# Patient Record
(demographics unavailable — no encounter records)

---

## 2025-07-24 NOTE — PLAN
[FreeTextEntry1] : Patient here for an annual well woman encounter. Patient with atrophic gynecological exam. Sister with hx premenopausal br ca   Hx osteopenia Weightbearing exercise encouraged. Referral provided today for mammogram and sonogram. Referral provided today for DEXA. Patient to follow-up for annual gynecological follow-up or as needed. PHQ-9 questionnaire reviewed. Patient scored 0. No intervention needed.  I, Sabrina Parikh, acted solely as a scribe for Dr. Piter Pro on this date 07/24/2025.   All medical record entries made by the Scribe were at my, Dr. Piter Pro, direction on 07/24/2025. I have reviewed the chart and agree that the record accurately reflects my personal performance of the history, physical exam, assessment and plan.

## 2025-07-24 NOTE — HISTORY OF PRESENT ILLNESS
[FreeTextEntry1] : Patient is a 72-year-old here for an annual well woman encounter without complaints. Denies any vaginal discharge, itching and burning. Denies any urinary problems.  Patients last mammogram and sonogram was 04/2024. Patients last colonoscopy was 2017. On a 10-year schedule. Patients last bone densitometry was in March 2023 showing osteopenia.  Medications: Medication for Blood pressure Allergies: NKDA PMHx: HTN FHx: Sister with hx of breast cancer in her 40s PSHx: Laparoscopy for infertility            Cesarian section Social Hx: Negative

## 2025-07-24 NOTE — PHYSICAL EXAM
[Chaperoned Physical Exam] : A chaperone was present in the examining room during all aspects of the physical examination. [MA] : MA [Appropriately responsive] : appropriately responsive [Alert] : alert [No Acute Distress] : no acute distress [No Lymphadenopathy] : no lymphadenopathy [Non-distended] : non-distended [No Lesions] : no lesions [No Mass] : no mass [Normal] : normal [Vulvar Atrophy] : vulvar atrophy [Atrophy] : atrophy [Uterine Adnexae] : non-palpable [FreeTextEntry2] : Lauren Dhaliwal [FreeTextEntry3] : No thyromegaly  [FreeTextEntry6] : No masses, no axillary adenopathy, and no nipple discharge  [FreeTextEntry7] : Soft, non-tender, no adenopathy, no HSM, healed Pfannenstiel incision, Healed umbilical incision for laparoscopy for infertility, Moderately elevated BMI [FreeTextEntry9] : Normal, guaiac negative